# Patient Record
Sex: FEMALE | Race: BLACK OR AFRICAN AMERICAN | Employment: FULL TIME | URBAN - METROPOLITAN AREA
[De-identification: names, ages, dates, MRNs, and addresses within clinical notes are randomized per-mention and may not be internally consistent; named-entity substitution may affect disease eponyms.]

---

## 2024-05-24 ENCOUNTER — OFFICE VISIT (OUTPATIENT)
Dept: URGENT CARE | Facility: CLINIC | Age: 29
End: 2024-05-24
Payer: OTHER GOVERNMENT

## 2024-05-24 VITALS
DIASTOLIC BLOOD PRESSURE: 105 MMHG | OXYGEN SATURATION: 97 % | SYSTOLIC BLOOD PRESSURE: 132 MMHG | TEMPERATURE: 98 F | RESPIRATION RATE: 17 BRPM | HEART RATE: 85 BPM

## 2024-05-24 DIAGNOSIS — H66.90 OTITIS MEDIA, UNSPECIFIED LATERALITY, UNSPECIFIED OTITIS MEDIA TYPE: Primary | ICD-10-CM

## 2024-05-24 PROCEDURE — 99213 OFFICE O/P EST LOW 20 MIN: CPT | Mod: S$GLB,,, | Performed by: NURSE PRACTITIONER

## 2024-05-24 RX ORDER — AMOXICILLIN 500 MG/1
500 CAPSULE ORAL EVERY 8 HOURS
Qty: 30 CAPSULE | Refills: 0 | Status: SHIPPED | OUTPATIENT
Start: 2024-05-24

## 2024-05-24 NOTE — PATIENT INSTRUCTIONS
Take full course of antibiotics as prescribed.  Humidifier use at home.  Warm compresses to affected ear  Elevate head on a pillow at night   OTC Antihistamines prn  Tylenol or Motrin every 4 - 6 hours as needed for fever, pain or fussiness.  Monitor b/p pressure and record. If you have cont elevated readings f/u with PCP  Follow up in the ER for any worsening of symptoms such as new fever, increasing ear pain, neck stiffness, shortness of breath, etc.

## 2024-05-24 NOTE — PROGRESS NOTES
Subjective:      Patient ID: Graham Hammonds is a 29 y.o. female.    Vitals:  temporal temperature is 97.7 °F (36.5 °C). Her blood pressure is 132/105 (abnormal) and her pulse is 85. Her respiration is 17 and oxygen saturation is 97%.     Chief Complaint: No chief complaint on file.    Patient is a 29 y.o. female with the compliant of bilateral ear fullness and ringing in the right ear that presented on last night.     Ear Fullness   There is pain in both ears. This is a new problem. The current episode started in the past 7 days. The problem occurs every few minutes. The problem has been gradually worsening. There has been no fever. The pain is at a severity of 4/10. The pain is mild. Associated symptoms include headaches and hearing loss. Pertinent negatives include no abdominal pain, coughing, diarrhea, ear discharge, rash, rhinorrhea, sore throat or vomiting.       HENT:  Positive for hearing loss. Negative for ear discharge and sore throat.    Respiratory:  Negative for cough.    Gastrointestinal:  Negative for abdominal pain, vomiting and diarrhea.   Skin:  Negative for rash.   Neurological:  Positive for headaches.      Objective:     Physical Exam   Constitutional: No distress.   HENT:   Head: Normocephalic.   Ears:   Right Ear: Tympanic membrane is erythematous and bulging.   Left Ear: Tympanic membrane, external ear and ear canal normal.   Nose: Nose normal.   Mouth/Throat: Mucous membranes are moist. Oropharynx is clear.   Pulmonary/Chest: Effort normal and breath sounds normal.   Abdominal: Normal appearance.   Neurological: She is alert.       Assessment:     1. Otitis media, unspecified laterality, unspecified otitis media type        Plan:       Otitis media, unspecified laterality, unspecified otitis media type  -     amoxicillin (AMOXIL) 500 MG capsule; Take 1 capsule (500 mg total) by mouth every 8 (eight) hours.  Dispense: 30 capsule; Refill: 0      Patient Instructions   Take full course of  antibiotics as prescribed.  Humidifier use at home.  Warm compresses to affected ear  Elevate head on a pillow at night   OTC Antihistamines prn  Tylenol or Motrin every 4 - 6 hours as needed for fever, pain or fussiness.  Monitor b/p pressure and record. If you have cont elevated readings f/u with PCP  Follow up in the ER for any worsening of symptoms such as new fever, increasing ear pain, neck stiffness, shortness of breath, etc.